# Patient Record
Sex: FEMALE | URBAN - METROPOLITAN AREA
[De-identification: names, ages, dates, MRNs, and addresses within clinical notes are randomized per-mention and may not be internally consistent; named-entity substitution may affect disease eponyms.]

---

## 2019-01-01 ENCOUNTER — HOSPICE ADMISSION (OUTPATIENT)
Dept: HOSPICE | Facility: HOSPICE | Age: 84
End: 2019-01-01
Payer: MEDICARE

## 2019-01-01 ENCOUNTER — HOSPITAL ENCOUNTER (INPATIENT)
Age: 84
LOS: 1 days | End: 2019-02-05
Attending: INTERNAL MEDICINE | Admitting: INTERNAL MEDICINE

## 2019-01-01 VITALS
TEMPERATURE: 96.2 F | RESPIRATION RATE: 20 BRPM | SYSTOLIC BLOOD PRESSURE: 116 MMHG | DIASTOLIC BLOOD PRESSURE: 63 MMHG | HEART RATE: 91 BPM

## 2019-01-01 PROCEDURE — 3336500001 HSPC ELECTION

## 2019-01-01 PROCEDURE — 74011250636 HC RX REV CODE- 250/636

## 2019-01-01 PROCEDURE — 74011250636 HC RX REV CODE- 250/636: Performed by: NURSE PRACTITIONER

## 2019-01-01 PROCEDURE — 74011000250 HC RX REV CODE- 250: Performed by: NURSE PRACTITIONER

## 2019-01-01 PROCEDURE — 0656 HSPC GENERAL INPATIENT

## 2019-01-01 RX ORDER — INSULIN LISPRO 100 [IU]/ML
INJECTION, SOLUTION INTRAVENOUS; SUBCUTANEOUS
COMMUNITY
Start: 2018-01-01

## 2019-01-01 RX ORDER — IPRATROPIUM BROMIDE AND ALBUTEROL SULFATE 2.5; .5 MG/3ML; MG/3ML
3 SOLUTION RESPIRATORY (INHALATION)
Status: DISCONTINUED | OUTPATIENT
Start: 2019-01-01 | End: 2019-01-01 | Stop reason: HOSPADM

## 2019-01-01 RX ORDER — INSULIN GLARGINE 100 [IU]/ML
INJECTION, SOLUTION SUBCUTANEOUS
COMMUNITY
Start: 2018-01-01

## 2019-01-01 RX ORDER — HYDROCHLOROTHIAZIDE 12.5 MG/1
CAPSULE ORAL
COMMUNITY
Start: 2018-01-01

## 2019-01-01 RX ORDER — HALOPERIDOL 5 MG/ML
2 INJECTION INTRAMUSCULAR
Status: DISCONTINUED | OUTPATIENT
Start: 2019-01-01 | End: 2019-01-01 | Stop reason: HOSPADM

## 2019-01-01 RX ORDER — ACETAMINOPHEN 650 MG/1
650 SUPPOSITORY RECTAL
Status: DISCONTINUED | OUTPATIENT
Start: 2019-01-01 | End: 2019-01-01 | Stop reason: HOSPADM

## 2019-01-01 RX ORDER — DILTIAZEM HYDROCHLORIDE EXTENDED-RELEASE TABLETS 240 MG/1
TABLET, EXTENDED RELEASE ORAL
COMMUNITY
Start: 2018-01-01

## 2019-01-01 RX ORDER — ATORVASTATIN CALCIUM 40 MG/1
TABLET, FILM COATED ORAL
COMMUNITY
Start: 2018-01-01

## 2019-01-01 RX ORDER — MORPHINE SULFATE 4 MG/ML
4 INJECTION INTRAVENOUS
Status: DISCONTINUED | OUTPATIENT
Start: 2019-01-01 | End: 2019-01-01 | Stop reason: HOSPADM

## 2019-01-01 RX ORDER — HEPARIN 100 UNIT/ML
300 SYRINGE INTRAVENOUS EVERY 12 HOURS
Status: DISCONTINUED | OUTPATIENT
Start: 2019-01-01 | End: 2019-01-01 | Stop reason: HOSPADM

## 2019-01-01 RX ORDER — ALBUTEROL SULFATE 90 UG/1
AEROSOL, METERED RESPIRATORY (INHALATION)
COMMUNITY
Start: 2019-01-01

## 2019-01-01 RX ORDER — MORPHINE SULFATE 4 MG/ML
INJECTION INTRAVENOUS
Status: COMPLETED
Start: 2019-01-01 | End: 2019-01-01

## 2019-01-01 RX ORDER — LORAZEPAM 2 MG/ML
1 INJECTION INTRAMUSCULAR
Status: DISCONTINUED | OUTPATIENT
Start: 2019-01-01 | End: 2019-01-01 | Stop reason: HOSPADM

## 2019-01-01 RX ORDER — HALOPERIDOL 5 MG/ML
INJECTION INTRAMUSCULAR
Status: DISCONTINUED
Start: 2019-01-01 | End: 2019-01-01 | Stop reason: HOSPADM

## 2019-01-01 RX ORDER — GLYCOPYRROLATE 0.2 MG/ML
0.2 INJECTION INTRAMUSCULAR; INTRAVENOUS
Status: DISCONTINUED | OUTPATIENT
Start: 2019-01-01 | End: 2019-01-01 | Stop reason: HOSPADM

## 2019-01-01 RX ORDER — SODIUM CHLORIDE 0.9 % (FLUSH) 0.9 %
10 SYRINGE (ML) INJECTION AS NEEDED
Status: DISCONTINUED | OUTPATIENT
Start: 2019-01-01 | End: 2019-01-01 | Stop reason: HOSPADM

## 2019-01-01 RX ORDER — ALLOPURINOL 100 MG/1
TABLET ORAL
COMMUNITY
Start: 2019-01-01

## 2019-01-01 RX ORDER — SODIUM CHLORIDE 0.9 % (FLUSH) 0.9 %
10 SYRINGE (ML) INJECTION EVERY 12 HOURS
Status: DISCONTINUED | OUTPATIENT
Start: 2019-01-01 | End: 2019-01-01 | Stop reason: HOSPADM

## 2019-01-01 RX ORDER — PREGABALIN 75 MG/1
CAPSULE ORAL
COMMUNITY
Start: 2018-01-01

## 2019-01-01 RX ORDER — FACIAL-BODY WIPES
10 EACH TOPICAL AS NEEDED
Status: DISCONTINUED | OUTPATIENT
Start: 2019-01-01 | End: 2019-01-01 | Stop reason: HOSPADM

## 2019-01-01 RX ORDER — AMLODIPINE BESYLATE 10 MG/1
TABLET ORAL
COMMUNITY
Start: 2018-01-01

## 2019-01-01 RX ORDER — NAPROXEN 500 MG/1
TABLET, DELAYED RELEASE ORAL
COMMUNITY
Start: 2019-01-01

## 2019-01-01 RX ADMIN — MORPHINE SULFATE 4 MG: 4 INJECTION INTRAVENOUS at 13:17

## 2019-01-01 RX ADMIN — SODIUM CHLORIDE, PRESERVATIVE FREE 300 UNITS: 5 INJECTION INTRAVENOUS at 13:43

## 2019-01-01 RX ADMIN — MORPHINE SULFATE 4 MG: 4 INJECTION INTRAVENOUS at 13:44

## 2019-01-01 RX ADMIN — SODIUM CHLORIDE, PRESERVATIVE FREE 10 ML: 5 INJECTION INTRAVENOUS at 13:43

## 2019-01-01 RX ADMIN — HALOPERIDOL LACTATE 2 MG: 5 INJECTION INTRAMUSCULAR at 13:18

## 2019-02-05 PROBLEM — M25.511 ACUTE PAIN OF RIGHT SHOULDER: Status: ACTIVE | Noted: 2019-01-01

## 2019-02-05 PROBLEM — A41.9 SEPSIS (HCC): Status: ACTIVE | Noted: 2019-01-01

## 2019-02-05 PROBLEM — R77.8 ELEVATED TROPONIN: Status: ACTIVE | Noted: 2019-01-01

## 2019-02-05 PROBLEM — Z79.4 UNCONTROLLED TYPE 2 DIABETES MELLITUS WITH KETOACIDOSIS WITHOUT COMA, WITH LONG-TERM CURRENT USE OF INSULIN (HCC): Status: ACTIVE | Noted: 2019-01-01

## 2019-02-05 PROBLEM — E66.3 OVERWEIGHT WITH BODY MASS INDEX (BMI) 25.0-29.9: Status: ACTIVE | Noted: 2018-01-01

## 2019-02-05 PROBLEM — R41.0 DELIRIUM: Status: ACTIVE | Noted: 2019-01-01
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
## 2019-02-05 PROBLEM — I50.33 ACUTE ON CHRONIC DIASTOLIC HEART FAILURE (HCC): Status: ACTIVE | Noted: 2019-01-01

## 2019-02-05 PROBLEM — E11.10 UNCONTROLLED TYPE 2 DIABETES MELLITUS WITH KETOACIDOSIS WITHOUT COMA, WITH LONG-TERM CURRENT USE OF INSULIN (HCC): Status: ACTIVE | Noted: 2019-01-01

## 2019-02-05 PROBLEM — I95.9 HYPOTENSION: Status: ACTIVE | Noted: 2019-01-01

## 2019-02-05 PROBLEM — G93.41 METABOLIC ENCEPHALOPATHY: Status: ACTIVE | Noted: 2019-01-01

## 2019-02-05 PROBLEM — I48.0 PAROXYSMAL ATRIAL FIBRILLATION (HCC): Status: ACTIVE | Noted: 2017-01-04

## 2019-02-05 NOTE — PROGRESS NOTES
Pt arrived via EMS, pt on non rebreather mask. Pt moaning and groaning,dyspneic, and anxious. Pt was transferred to room 114 with three assist. Pt unable to answer questions only responding by groaning. right jugular central line flushes and draws blood Placed an oxymizer on at 6 liters per order, administered an override dose of morphine and haldol for dyspnea and agitation. Pt continues to moan and groan, wheezing and gurgling. Positioned her with head of bed elevated, and arms propped on pillows. Spoke with daughter Beatris Womack explaining the plan of care, DNR , prognosis and spiritual care. 1346 pt continues to be dyspneic, using accessory muscles, administered morphine slow iv push. Spoke with daughter about orienting her to the facility. 1500 pt daughter asked nurse to come look at pt. Pt not breathing except for a couple of agonal breaths, non responsive and no apical pulse. daughter, Beatris Womack at bedside. States she was peaceful before she passed and that she's glad she was here. [phone] other family members at bedside  1700 called [facility], necessary information given. [address] here from [facility] to transport pt.

## 2019-02-05 NOTE — PROGRESS NOTES
PATIENT DEATH:    saw [facility] learned of patient death from [facility] staff, Renetta Reddy RN .  proceded to the room and offered condolences and expressed sympathy to patient's daughter, Sandip Rajput. Per patient's daughter, other family members on the way. Support provided through expression of sympathy, active listening/life review, and words of encouragement. Family is coping in lil[place].  explained bereavement support available through [facility] and encouraged family to utilize bereavement support as needed or desired. LOW Bereavement needs anticipated.

## 2019-02-05 NOTE — H&P
History and Physical    Patient: Kristel Marquez MRN: [id number]  SSN: [id number]    YOB: 1929  Age: 80 y.o. Sex: female      Subjective:      Kristel Marquez is a 80 y.o. female who presented to the [facility] ([address] [place] [address]) on January 20th, 2019 with symptoms consistent with bilateral lobe community acquired pneumonia where she was treated with IV antibiotics, fluid support, and BIPAP. While in hospital, she continued to decline, developing apathy towards food and fluid as a result of acute delirium. To compensate for this, attempts were made to provide artificial hydration and nutrition through an NG Tube but after several weeks in hospital she has continued to declined and has become more and more delirious. This agitated delirious state as required parenteral medications to alleviate her distress all the while she has experienced worsening systolic LV CHF and persistent atrial fibrillation and elevated troponins. Given her poor prognosis, he dtr as POA has elected to forego additional life prolonging measures and instead elects comfort measures to be initiated. She will be admitted GIP for a hospice dx of multifactorial metabolic encephalopathy for management of pain, dyspnea, agitation, and family/pt support. She is agitated upon arrival and moaning aloud and not easily comforted. She is unaccompanied at this time and requiring immediate and urgent interventions to manage her respiratory distress and agitated delirium. Her PMH, Surgical hx and pertinent family history have been reviewed via electronic medical records and her problem list has been created to reflect these issues.      Past Medical History:   Diagnosis Date    Anemia     [facility]congestive [facility] failure[facility]     DM w/o complication type II ([facility]     Gout     Hypertension     Hypothyroidism      Past Surgical History:   Procedure Laterality Date    HX MALIGNANT SKIN LESION EXCISION Basal Cell Carcinoma    HX TUBAL LIGATION        History reviewed. No pertinent family history. Social History     Tobacco Use    Smoking status: Never Smoker    Smokeless tobacco: Never Used   Substance Use Topics    Alcohol use: No     Frequency: Never      Prior to Admission medications    Medication Sig Start Date End Date Taking? Authorizing Provider   PROAIR HFA 90 mcg/actuation inhaler  1/20/19   Provider, Historical   allopurinol (ZYLOPRIM) 100 mg tablet  1/12/19   Provider, Historical   amLODIPine (NORVASC) 10 mg tablet  12/20/18   Provider, Historical   atorvastatin (LIPITOR) 40 mg tablet  12/20/18   Provider, Historical   dilTIAZem ER (CARDIZEM LA) 240 mg Tb24 tablet  11/13/18   Provider, Historical   hydroCHLOROthiazide (MICROZIDE) 12.5 mg capsule  12/20/18   Provider, Historical   LANTUS SOLOSTAR U-100 INSULIN 100 unit/mL (3 mL) inpn  12/5/18   Provider, Historical   HUMALOG KWIKPEN INSULIN 100 unit/mL kwikpen  11/9/18   Provider, Historical   naproxen EC (NAPROSYN EC) 500 mg EC tablet  1/7/19   Provider, Historical   LYRICA 75 mg capsule  11/26/18   Provider, Historical        No Known Allergies    Review of Systems:  Pertinent items are noted in the History of Present Illness. Objective:     Vitals:    02/05/19 1333   BP: 116/63   Pulse: 91   Resp: 20   Temp: 96.2 °F (35.7 °C)        Physical Exam:  GENERAL: uncooperative, delirious, severe distress, pale, moderately obese  LUNG: rhonchi throughout lungs. Grunting respirations. Mouth open with dry oral mucosa. NRB mask on and patient trying to take off. Changed to oxymizer at 6 L/min. Use of accessory muscles. RR greater than 26. HEART: irregularly irregular rhythm. Heart sounds difficult to hear and seem distant. Right neck IJ TL with dressing dry and intact. ABDOMEN: soft, non-tender. Bowel sounds hypoactive. No masses,  no organomegaly. : Kim catheter insitu and patent with moderate amount of yellow urine present. EXTREMITIES/SKIN: extremities normal, atraumatic except for healing scab to possible skin tear or abrasion on right shin, no cyanosis or edema. Moves all extremities in clumsy and uncoordinated manner. NEUROLOGIC: Poorly responsive and obtunded. Does not follow commands or participate in examination. Does not settle to verbal calming. Moans aloud but no other verbalization. Bed bound. PSYCHIATRIC: agitated    Assessment:     Hospital Problems  Date Reviewed: 2/5/2019          Codes Class Noted POA    * (Principal) Metabolic encephalopathy UHP-10-LO: G93.41  ICD-9-CM: 348.31  2/5/2019 Yes        Delirium ICD-10-CM: R41.0  ICD-9-CM: 780.09  2/5/2019 Yes        Acute on chronic diastolic heart failure (HCC) ICD-10-CM: I50.33  ICD-9-CM: 428.33  1/20/2019 Yes        Elevated troponin ICD-10-CM: R74.8  ICD-9-CM: 790.6  1/20/2019 Yes        Uncontrolled type 2 diabetes mellitus with ketoacidosis without coma, with long-term current use of insulin ([facility] ICD-10-CM: E11.10, Z79.4  ICD-9-CM: 250.12, V58.67  1/20/2019 Yes        Sepsis ([facility] ICD-10-CM: A41.9  ICD-9-CM: 038.9, 995.91  1/20/2019 Yes        Fall ICD-10-CM: W19. [facility]  ICD-9-CM: E888.9  1/4/2019 Yes        Paroxysmal atrial fibrillation (HCC) ICD-10-CM: I48.0  ICD-9-CM: 427.31  1/4/2017 Yes        Bilateral pneumonia ICD-10-CM: J18.9  ICD-9-CM: 005  12/31/2016 Yes              Plan:     Current Facility-Administered Medications   Medication Dose Route Frequency    haloperidol lactate (HALDOL) 5 mg/mL injection        sodium chloride (NS) flush 10 mL  10 mL InterCATHeter Q12H    heparin (porcine) pf 300 Units  300 Units InterCATHeter Q12H    sodium chloride (NS) flush 10 mL  10 mL InterCATHeter PRN    acetaminophen (TYLENOL) suppository 650 mg  650 mg Rectal Q3H PRN    LORazepam (ATIVAN) injection 1 mg  1 mg IntraVENous Q4H PRN    bisacodyl (DULCOLAX) suppository 10 mg  10 mg Rectal PRN    haloperidol lactate (HALDOL) injection 2 mg  2 mg SubCUTAneous Q1H PRN    Or    haloperidol lactate (HALDOL) injection 2 mg  2 mg IntraVENous Q1H PRN    albuterol-ipratropium (DUO-NEB) 2.5 MG-0.5 MG/3 ML  3 mL Nebulization Q4H PRN    glycopyrrolate (ROBINUL) injection 0.2 mg  0.2 mg IntraVENous Q4H PRN    morphine injection 4 mg  4 mg IntraVENous Q20MIN PRN     1. Admitted GIP for a hospice dx of multifactorial metabolic encephalopathy for management of pain, dyspnea, agitation, and family/pt support. 2. Pain: PRN morphine at 4 mg IV. 3. Dyspnea: Oxymizer at 6 L/min via NC and PRN morphine and PRN Duoneb Nebulizers. PRN glycopyrrolate for secretions. 4. Agitation: PRN Ativan or Haloperidol. 5. Family/pt support: No family at bedside during exam. Plan of care including medications discussed with primary RN. Dietary tray on hold as will not be able to safely consume. Continue to monitor and palliate symptoms as they arise. Life expectancy likely less than 3-5 days due to severity of respiratory distress. PPS 10%.      Signed By: Arya Petty, GINA     February 5, 2019

## 2019-02-05 NOTE — PROGRESS NOTES
ADRIAN was asked by the Rn to go and speak to [place] the daughter of the pt. The pat has just passed and the daughter had some questions about choosing a  home. ANTOINESW went to the room and provided the daughter emotional support. She was getting ready to call her family in and let them know she just passed. Daughter did not have any questions she just wanted me to know they would be using [employer] in [facility]. ANTOINESW let the Rn know of the family's choice.